# Patient Record
Sex: FEMALE | Race: AMERICAN INDIAN OR ALASKA NATIVE
[De-identification: names, ages, dates, MRNs, and addresses within clinical notes are randomized per-mention and may not be internally consistent; named-entity substitution may affect disease eponyms.]

---

## 2020-03-05 ENCOUNTER — HOSPITAL ENCOUNTER (EMERGENCY)
Dept: HOSPITAL 7 - FB.ED | Age: 50
Discharge: HOME | End: 2020-03-05
Payer: MEDICAID

## 2020-03-05 DIAGNOSIS — Z91.040: ICD-10-CM

## 2020-03-05 DIAGNOSIS — J45.901: Primary | ICD-10-CM

## 2020-03-05 DIAGNOSIS — Z88.8: ICD-10-CM

## 2020-03-05 DIAGNOSIS — Z87.891: ICD-10-CM

## 2020-03-05 PROCEDURE — 71046 X-RAY EXAM CHEST 2 VIEWS: CPT

## 2020-03-05 PROCEDURE — 99285 EMERGENCY DEPT VISIT HI MDM: CPT

## 2020-03-05 PROCEDURE — 94640 AIRWAY INHALATION TREATMENT: CPT

## 2020-03-05 PROCEDURE — 96372 THER/PROPH/DIAG INJ SC/IM: CPT

## 2020-03-05 NOTE — EDM.PDOC
ED HPI GENERAL MEDICAL PROBLEM





- General


Chief Complaint: Respiratory Problem


Stated Complaint: cough


Time Seen by Provider: 20 19:15


Source of Information: Reports: Patient


History Limitations: Reports: No Limitations





- History of Present Illness


INITIAL COMMENTS - FREE TEXT/NARRATIVE: 





Patient presented to the ED because of coughing and dyspnea for 2 days. The 

cough is mostly non-productive,denies having fever,chills or malaise. She ran 

out of her albuteol inhaler since last week.


  ** left chest/rib area


Pain Score (Numeric/FACES): 5





- Related Data


 Allergies











Allergy/AdvReac Type Severity Reaction Status Date / Time


 


latex Allergy  Other Verified 20 20:57


 


pseudoephedrine Allergy  Other Verified 20 20:57





[From [facility]]     











Home Meds: 


 Home Meds





Albuterol [Proventil HFA] 2 puff INH ASDIRECTED PRN 20 [History]


Albuterol [Ventolin HFA] 2 puff IH Q4H PRN #1 inhaler 20 [Rx]


predniSONE 40 mg PO DAILY #10 tab 20 [Rx]











Past Medical History


HEENT History: Reports: Impaired Vision


Respiratory History: Reports: Asthma


OB/GYN History: Reports: Pregnancy


Musculoskeletal History: Reports: Fracture, Other (See Below)


Other Musculoskeletal History: several fractures in hands and fingers


Dermatologic History: Reports: Eczema





- Past Surgical History


GI Surgical History: Reports: Cholecystectomy


Female  Surgical History: Reports:  Section, Hysterectomy


Endocrine Surgical History: Reports: Thyroid Biopsy, Thyroidectomy





Social & Family History





- Family History


Family Medical History: Noncontributory





- Tobacco Use


Smoking Status *Q: Former Smoker


Used Tobacco, but Quit: Yes


Month/Year Tobacco Last Used: 





- Caffeine Use


Caffeine Use: Reports: Coffee, Soda





- Recreational Drug Use


Recreational Drug Use: No





ED ROS GENERAL





- Review of Systems


Review Of Systems: See Below


Constitutional: Reports: No Symptoms


HEENT: Reports: No Symptoms


Respiratory: Reports: Shortness of Breath, Wheezing


Cardiovascular: Reports: No Symptoms


Endocrine: Reports: No Symptoms


GI/Abdominal: Reports: No Symptoms


: Reports: No Symptoms


Musculoskeletal: Reports: No Symptoms


Skin: Reports: No Symptoms


Neurological: Reports: No Symptoms


Psychiatric: Reports: No Symptoms





ED EXAM, GENERAL





- Physical Exam


Exam: See Below


Exam Limited By: No Limitations


General Appearance: Alert, No Apparent Distress


Eye Exam: Bilateral Eye: PERRL


Ears: Normal External Exam, Normal Canal, Hearing Grossly Normal


Nose: Normal Inspection, Normal Mucosa, No Blood


Throat/Mouth: Normal Inspection, Normal Lips


Head: Atraumatic, Normocephalic


Neck: Normal Inspection, Supple, Non-Tender, Full Range of Motion


Respiratory/Chest: No Respiratory Distress, Lungs Clear, Rhonchi, Wheezing


Cardiovascular: Normal Peripheral Pulses, Regular Rate, Rhythm, No Edema, No 

Gallop, No Rub


GI/Abdominal: Normal Bowel Sounds, Soft, Non-Tender, No Organomegaly


Back Exam: Normal Inspection, Full Range of Motion


Extremities: Normal Inspection, Normal Range of Motion


Neurological: Alert, Oriented, CN II-XII Intact, Normal Cognition, Normal Gait, 

No Motor/Sensory Deficits





Course





- Vital Signs


Text/Narrative:: 





CXR-nek


duoneb x1


solumedrol 125 mg IM x1


Last Recorded V/S: 


 Last Vital Signs











Temp  36.8 C   20 19:10


 


Pulse  98   20 21:03


 


Resp  20   20 21:03


 


BP  134/83   20 21:03


 


Pulse Ox  96   20 21:03














- Orders/Labs/Meds


Orders: 


 Active Orders 24 hr











 Category Date Time Status


 


 RT Aerosol Therapy [RC] ASDIRECTED Care  20 19:36 Active


 


 Chest 2V [CR] Stat Exams  20 19:36 Taken











Meds: 


Medications














Discontinued Medications














Generic Name Dose Route Start Last Admin





  Trade Name Freq  PRN Reason Stop Dose Admin


 


Albuterol/Ipratropium  3 ml  20 19:35  20 19:49





  Duoneb 3.0-0.5 Mg/3 Ml  NEB  20 19:36  3 ml





  ONETIME ONE   Administration





     





     





     





     


 


Methylprednisolone Sodium Succinate  125 mg  20 19:35  20 19:49





  Solu-Medrol  IM  20 19:36  125 mg





  ONETIME ONE   Administration





     





     





     





     














Departure





- Departure


Time of Disposition: 21:00


Disposition: Home, Self-Care 01


Condition: Good


Clinical Impression: 


 Asthma exacerbation








- Discharge Information


Prescriptions: 


Albuterol [Ventolin HFA] 2 puff IH Q4H PRN #1 inhaler


 PRN Reason: wheezing/shortness of breath


predniSONE 40 mg PO DAILY #10 tab


Instructions:  Asthma, Adult


Referrals: 


PCP,None [Primary Care Provider] - 


Forms:  ED Department Discharge


Additional Instructions: 


please read discharge instructions on asthma exacerbation


increase oral fluids


prednisone 40 mg daily for 5 days


albuterol inhaler, 2 puffs every 4-6 hours as needed for wheezing and shortness 

of breath


follow up as needed





Sepsis Event Note





- Evaluation


Sepsis Screening Result: No Definite Risk





- Focused Exam


Vital Signs: 


 Vital Signs











  Temp Pulse Resp BP Pulse Ox


 


 20 21:03   98  20  134/83  96


 


 20 19:10  36.8 C  112 H  12  132/79  95











Date Exam was Performed: 20


Time Exam was Performed: 22:28





- My Orders


Last 24 Hours: 


My Active Orders





20 19:36


RT Aerosol Therapy [RC] ASDIRECTED 


Chest 2V [CR] Stat 














- Assessment/Plan


Last 24 Hours: 


My Active Orders





20 19:36


RT Aerosol Therapy [RC] ASDIRECTED 


Chest 2V [CR] Stat

## 2020-03-06 NOTE — CR
INDICATION:  Cough, dyspnea. 



CHEST, 2 VIEWS:  PA and lateral views of the chest, 03/05/20 - no comparisons.



The heart appears normal in size and shape.  



Some minimal calcification suggested in the arch of the aorta. 



Mild-to-moderate degenerative hypertrophic changes are noted off vertebral 
bodies in the lower thoracic spine. 



A definite active infiltrate or effusion was not identified.  



Overlying EKG leads are noted.  



IMPRESSION:  No acute process.  
MTDD

## 2021-11-19 ENCOUNTER — HOSPITAL ENCOUNTER (EMERGENCY)
Dept: HOSPITAL 7 - FB.ED | Age: 51
Discharge: HOME | End: 2021-11-19
Payer: MEDICAID

## 2021-11-19 DIAGNOSIS — J45.41: Primary | ICD-10-CM

## 2021-11-19 DIAGNOSIS — Z91.040: ICD-10-CM

## 2021-11-19 DIAGNOSIS — Z79.899: ICD-10-CM

## 2021-11-19 DIAGNOSIS — Z88.8: ICD-10-CM

## 2021-11-19 PROCEDURE — 71046 X-RAY EXAM CHEST 2 VIEWS: CPT

## 2021-11-19 PROCEDURE — 99285 EMERGENCY DEPT VISIT HI MDM: CPT

## 2021-11-19 PROCEDURE — 96372 THER/PROPH/DIAG INJ SC/IM: CPT

## 2021-11-19 RX ADMIN — ALBUTEROL SULFATE ONE MG: 2.5 SOLUTION RESPIRATORY (INHALATION) at 17:02

## 2021-11-19 NOTE — EDM.PDOC
ED HPI GENERAL MEDICAL PROBLEM





- General


Stated Complaint: COUGH, SOB


Time Seen by Provider: 21 16:35


Source of Information: Reports: Patient


History Limitations: Reports: No Limitations





- History of Present Illness


INITIAL COMMENTS - FREE TEXT/NARRATIVE: 





50-year-old female who reports Covid 19 infection on 10/6/2021 and she reports 

that since then she has had a persisting cough extend waned but over the past 

week she has noticed an increase in the cough with production of creamy white 

somewhat thick phlegm and the feeling of increased shortness of breath. She 

reports that she does cough to the point of emesis at times. She has been using 

her albuterol inhaler without much relief. She has tried over-the-counter 

medications without any relief. She has been able to drink liquids well. She d

oes have pain in the lateral aspect of both chest that is worse with cough and 

feels like it's in the muscles. She has had no measured fever. She reports the 

pain in her chest with cough goes up to a 10/10. It is a sore in sharp-type 

pain. No abdominal pain. No dysuria or hematuria. There are no other associated 

signs or symptoms. There are no other modifying factors.


Onset: Other (Ongoing for but seems to be worse over the past week.)


Duration: Constant, Getting Worse


Location: Reports: Chest


Quality: Reports: Sharp, Other (sore)


Severity: Moderate (to severe)


Improves with: Reports: None


Worsens with: Reports: Breathing, Other (Cough)


Context: Reports: Other (As above)


Associated Symptoms: Reports: No Other Symptoms (Except as above)


Treatments PTA: Reports: Other Medication(s) (Albuterol inhaler. 

Over-the-counter medications.)


  ** Bilateral Upper Back


Pain Score (Numeric/FACES): 10





- Related Data


                                    Allergies











Allergy/AdvReac Type Severity Reaction Status Date / Time


 


latex Allergy  Other Verified 20 20:57


 


pseudoephedrine Allergy  Other Verified 20 20:57





[From [facility]]     











Home Meds: 


                                    Home Meds





Albuterol [Proventil HFA] 2 puff INH ASDIRECTED PRN 20 [History]


Azithromycin [Zithromax] 250 mg PO DAILY #4 tab 21 [Rx]


predniSONE [Prednisone] 60 mg PO QAM 5 Days #15 tablet 21 [Rx]











Past Medical History


HEENT History: Reports: Impaired Vision


Respiratory History: Reports: Asthma


Musculoskeletal History: Reports: Fracture, Other (See Below)


Other Musculoskeletal History: several fractures in hands and fingers


Dermatologic History: Reports: Eczema





- Past Surgical History


GI Surgical History: Reports: Cholecystectomy


Female  Surgical History: Reports:  Section, Hysterectomy


Endocrine Surgical History: Reports: Thyroid Biopsy, Thyroidectomy





Social & Family History





- Tobacco Use


Tobacco Use Status *Q: Unknown Ever Used Tobacco (Nonsmoker)





- Caffeine Use


Caffeine Use: Reports: Coffee, Soda





- Alcohol Use


Alcohol Use History: No





ED ROS GENERAL





- Review of Systems


Review Of Systems: See Below


Constitutional: Reports: Malaise.  Denies: Fever, Chills


HEENT: Denies: Throat Pain, Throat Swelling


Respiratory: Reports: Shortness of Breath, Cough


Cardiovascular: Reports: Chest Pain.  Denies: Lightheadedness, Palpitations


GI/Abdominal: Denies: Nausea, Vomiting


: Denies: Dysuria, Hematuria


Musculoskeletal: Denies: Back Pain, Leg Pain, Foot Pain


Skin: Denies: Diaphoresis, Rash


Neurological: Denies: Confusion, Dizziness


Hematologic/Lymphatic: Denies: Easy Bleeding, Easy Bruising





ED EXAM, GENERAL





- Physical Exam


Exam: See Below


Exam Limited By: No Limitations


General Appearance: Alert, WD/WN, Mild Distress, Other (Hacking cough. No 

respiratory distress.)


Eye Exam: Bilateral Eye: EOMI, Normal Inspection, PERRL


Ears: Normal External Exam, Hearing Grossly Normal


Ear Exam: Bilateral Ear: Auricle Normal


Nose: No Blood, Nasal Drainage


Throat/Mouth: Normal Inspection, Normal Lips, Normal Teeth


Head: Atraumatic, Normocephalic


Respiratory/Chest: No Respiratory Distress, Lungs Clear, Normal Breath Sounds, N

o Accessory Muscle Use, Chest Non-Tender


Cardiovascular: Normal Peripheral Pulses, Regular Rate, Rhythm, No Edema


Peripheral Pulses: 2+: Radial (L), Radial (R)


GI/Abdominal: Normal Bowel Sounds, Soft, Non-Tender


Back Exam: Normal Inspection, Full Range of Motion


Extremities: Normal Inspection, Normal Range of Motion, Non-Tender, No Pedal 

Edema, Normal Capillary Refill


Neurological: Alert, Oriented, CN II-XII Intact, Normal Cognition, No Motor/Sen

mickey Deficits


Psychiatric: Normal Affect


Skin Exam: Warm, Dry, Intact, Normal Color, No Rash





Course





- Vital Signs


Last Recorded V/S: 


                                Last Vital Signs











Temp  36.7 C   21 16:26


 


Pulse  116 H  21 17:53


 


Resp  20   21 17:53


 


BP  153/87 H  21 17:53


 


Pulse Ox  94 L  21 17:53














- Orders/Labs/Meds


Orders: 


                               Active Orders 24 hr











 Category Date Time Status


 


 RT Aerosol Therapy [RC] ASDIRECTED Care  21 16:49 Active


 


 Chest 2V [CR] Stat Exams  21 16:49 Taken


 


 Azithromycin [Zithromax] Med  21 18:37 Once





 500 mg PO ONETIME ONE   


 


 cefTRIAXone [Rocephin] Med  21 18:37 Once





 1 gm IM ONETIME ONE   











Meds: 


Medications














Discontinued Medications














Generic Name Dose Route Start Last Admin





  Trade Name Freq  PRN Reason Stop Dose Admin


 


Albuterol  2.5 mg  21 16:47  21 17:02





  Albuterol 0.083% 2.5 Mg/3 Ml Neb Soln  NEB  21 16:48  2.5 mg





  ONETIME ONE   Administration


 


Albuterol/Ipratropium  3 ml  21 16:47  21 17:02





  Albuterol/Ipratropium 3.0-0.5 Mg/3 Ml Neb Soln  NEB  21 16:48  3 ml





  ONETIME ONE   Administration


 


Prednisone  60 mg  21 16:47  21 17:02





  Prednisone 20 Mg Tab  PO  21 16:48  60 mg





  ONETIME ONE   Administration














- Radiology Interpretation


Free Text/Narrative:: 


Chest x-ray showed no definite infiltrate per my read. 





- Re-Assessments/Exams


Free Text/Narrative Re-Assessment/Exam: 





21 18:35: Air movement is improved. She has increased wheezing from 

before. She states she feels improved from before nebulizer treatment. Her O2 

saturations are 94% on room air. Her pulse rate is up in the 110's but this is 

after a double albuterol neb. Nontoxic. Chest x-ray did not show any definite 

pneumonia. I will place the patient on Zithromax for 5 day course and will give 

her her first dose tonight.. I will also treat her with Rocephin 1 g IM. I will 

send prescriptions to her pharmacy for Zithromax and for prednisone. I have 

given her a spacer to use with the albuterol inhaler that she has to make it 

more effective. Scuffs all this with the patient and she is in agreement with 

this plan. Precautions and reasons for return to the emergency department were 

discussed with the patient while she was in the emergency department and were 

detailed in the patient's discharge instructions.





Departure





- Departure


Time of Disposition: 18:45


Disposition: Home, Self-Care 01


Condition: Good


Clinical Impression: 


 Bronchitis





Asthma exacerbation


Qualifiers:


 Asthma severity: moderate Asthma persistence: persistent Qualified Code(s): 

J45.41 - Moderate persistent asthma with (acute) exacerbation








- Discharge Information


Prescriptions: 


predniSONE [Prednisone] 60 mg PO QAM 5 Days #15 tablet


Azithromycin [Zithromax] 250 mg PO DAILY #4 tab


Instructions:  Acute Bronchitis, Adult, Easy-to-Read, Asthma, Adult, 

Easy-to-Read, How to Use a Metered Dose Inhaler


Referrals: 


PCP,None [Primary Care Provider] - 


Additional Instructions: 


You appear to be having an exacerbation of your asthma. This appears to be 

associated with a bronchitis. The x-ray of your chest showed no definite 

pneumonia. You should increase your fluid intake. Use the spacer (that I gave to

you) with your albuterol inhaler to make it more effective. Medication as 

prescribed (Zithromax, prednisone). Back to the emergency department for worse 

breathing, severe weakness, unrelenting vomiting or any other concerning signs 

or symptoms.





Sepsis Event Note (ED)





- Focused Exam


Vital Signs: 


                                   Vital Signs











  Temp Pulse Resp BP Pulse Ox


 


 21 17:53   116 H  20  153/87 H  94 L


 


 21 16:26  36.7 C  87  20  139/78  94 L














- My Orders


Last 24 Hours: 


My Active Orders





21 16:49


RT Aerosol Therapy [RC] ASDIRECTED 


Chest 2V [CR] Stat 





21 18:37


Azithromycin [Zithromax]   500 mg PO ONETIME ONE 


cefTRIAXone [Rocephin]   1 gm IM ONETIME ONE 














- Assessment/Plan


Last 24 Hours: 


My Active Orders





21 16:49


RT Aerosol Therapy [RC] ASDIRECTED 


Chest 2V [CR] Stat 





21 18:37


Azithromycin [Zithromax]   500 mg PO ONETIME ONE 


cefTRIAXone [Rocephin]   1 gm IM ONETIME ONE

## 2021-11-22 NOTE — CR
CHEST TWO VIEWS



INDICATION:   Cough, shortness of breath.



FINDINGS:  PA and lateral views of the chest 11/19/21 were compared with 
03/05/20.  



The heart remains normal in size and shape.  



The aorta is increased in tortuosity compared with the previous study.  
Increased flattening of diaphragm leaves and interdigitation of diaphragm leaves
suggests progressive COPD.  



Heavy markings compatible with pulmonary fibrosis are noted, mostly in the mid 
to lower lung fields without a definite consolidating pneumonia or effusion 
identified.  



Mild to moderate hypertrophic degenerative changes are noted in the thoracic 
spine.



IMPRESSION:  

1.  COPD, progressive.

2.  Pulmonary fibrosis.

3.  ASD aorta.

4.  DJD spine.  

MTDD